# Patient Record
Sex: FEMALE | Race: WHITE | NOT HISPANIC OR LATINO | URBAN - METROPOLITAN AREA
[De-identification: names, ages, dates, MRNs, and addresses within clinical notes are randomized per-mention and may not be internally consistent; named-entity substitution may affect disease eponyms.]

---

## 2021-04-13 DIAGNOSIS — Z23 ENCOUNTER FOR IMMUNIZATION: ICD-10-CM

## 2021-07-06 ENCOUNTER — NURSE TRIAGE (OUTPATIENT)
Dept: OTHER | Facility: OTHER | Age: 36
End: 2021-07-06

## 2021-07-06 DIAGNOSIS — Z11.52 ENCOUNTER FOR SCREENING FOR COVID-19: Primary | ICD-10-CM

## 2021-07-06 NOTE — TELEPHONE ENCOUNTER
Regarding: Travel Covid Swab  ----- Message from Mike Baltazar sent at 7/6/2021  3:06 PM EDT -----  Travel Covid Swab

## 2021-07-06 NOTE — TELEPHONE ENCOUNTER
Reason for Disposition   Information only question and nurse able to answer    Answer Assessment - Initial Assessment Questions  1   REASON FOR CALL or QUESTION: "What is your reason for calling today?" or "How can I best help you?" or "What question do you have that I can help answer?"      "I need a travel covid swab ordered"    Protocols used: INFORMATION ONLY CALL - NO TRIAGE-ADULT-[place]

## 2021-07-09 LAB — SARS-COV-2 RNA RESP QL NAA+PROBE: NEGATIVE
